# Patient Record
Sex: MALE | Race: WHITE | NOT HISPANIC OR LATINO | Employment: FULL TIME | URBAN - METROPOLITAN AREA
[De-identification: names, ages, dates, MRNs, and addresses within clinical notes are randomized per-mention and may not be internally consistent; named-entity substitution may affect disease eponyms.]

---

## 2017-01-25 PROBLEM — F32.A DEPRESSION: Status: ACTIVE | Noted: 2017-01-25

## 2017-01-25 PROBLEM — Z72.0 TOBACCO ABUSE: Status: ACTIVE | Noted: 2017-01-25

## 2017-01-25 PROBLEM — N52.9 ED (ERECTILE DYSFUNCTION): Status: ACTIVE | Noted: 2017-01-25

## 2017-01-26 ENCOUNTER — OFFICE VISIT (OUTPATIENT)
Dept: FAMILY MEDICINE CLINIC | Facility: CLINIC | Age: 42
End: 2017-01-26

## 2017-01-26 VITALS
WEIGHT: 212 LBS | BODY MASS INDEX: 33.27 KG/M2 | HEIGHT: 67 IN | RESPIRATION RATE: 18 BRPM | SYSTOLIC BLOOD PRESSURE: 104 MMHG | TEMPERATURE: 97 F | HEART RATE: 76 BPM | DIASTOLIC BLOOD PRESSURE: 80 MMHG | OXYGEN SATURATION: 96 %

## 2017-01-26 DIAGNOSIS — G25.81 RESTLESS LEGS SYNDROME (RLS): ICD-10-CM

## 2017-01-26 DIAGNOSIS — R53.83 FATIGUE, UNSPECIFIED TYPE: ICD-10-CM

## 2017-01-26 DIAGNOSIS — Z13.220 LIPID SCREENING: ICD-10-CM

## 2017-01-26 DIAGNOSIS — F39 MOOD DISORDER (HCC): Primary | ICD-10-CM

## 2017-01-26 LAB
ALBUMIN SERPL-MCNC: 4.5 G/DL (ref 3.2–4.8)
ALBUMIN/GLOB SERPL: 1.7 G/DL (ref 1.5–2.5)
ALP SERPL-CCNC: 91 U/L (ref 25–100)
ALT SERPL-CCNC: 37 U/L (ref 7–40)
AST SERPL-CCNC: 24 U/L (ref 0–33)
BASOPHILS # BLD AUTO: 0.03 10*3/MM3 (ref 0–0.2)
BASOPHILS NFR BLD AUTO: 0.5 % (ref 0–1)
BILIRUB SERPL-MCNC: 0.6 MG/DL (ref 0.3–1.2)
BUN SERPL-MCNC: 19 MG/DL (ref 9–23)
BUN/CREAT SERPL: 21.1 (ref 7–25)
CALCIUM SERPL-MCNC: 9.8 MG/DL (ref 8.7–10.4)
CHLORIDE SERPL-SCNC: 101 MMOL/L (ref 99–109)
CHOLEST SERPL-MCNC: 195 MG/DL (ref 0–200)
CO2 SERPL-SCNC: 27 MMOL/L (ref 20–31)
CREAT SERPL-MCNC: 0.9 MG/DL (ref 0.6–1.3)
EOSINOPHIL # BLD AUTO: 0.22 10*3/MM3 (ref 0.1–0.3)
EOSINOPHIL NFR BLD AUTO: 3.5 % (ref 0–3)
ERYTHROCYTE [DISTWIDTH] IN BLOOD BY AUTOMATED COUNT: 12.5 % (ref 11.3–14.5)
FERRITIN SERPL-MCNC: 300 NG/ML (ref 22–322)
GLOBULIN SER CALC-MCNC: 2.7 GM/DL
GLUCOSE SERPL-MCNC: 82 MG/DL (ref 70–100)
HCT VFR BLD AUTO: 49.2 % (ref 38.9–50.9)
HDLC SERPL-MCNC: 36 MG/DL (ref 40–60)
HGB BLD-MCNC: 16.3 G/DL (ref 13.1–17.5)
IMM GRANULOCYTES # BLD: 0.02 10*3/MM3 (ref 0–0.03)
IMM GRANULOCYTES NFR BLD: 0.3 % (ref 0–0.6)
IRON SATN MFR SERPL: 39 % (ref 20–50)
IRON SERPL-MCNC: 129 MCG/DL (ref 50–175)
LDLC SERPL CALC-MCNC: 133 MG/DL (ref 0–100)
LYMPHOCYTES # BLD AUTO: 2.46 10*3/MM3 (ref 0.6–4.8)
LYMPHOCYTES NFR BLD AUTO: 39.5 % (ref 24–44)
MCH RBC QN AUTO: 30.9 PG (ref 27–31)
MCHC RBC AUTO-ENTMCNC: 33.1 G/DL (ref 32–36)
MCV RBC AUTO: 93.4 FL (ref 80–99)
MONOCYTES # BLD AUTO: 0.38 10*3/MM3 (ref 0–1)
MONOCYTES NFR BLD AUTO: 6.1 % (ref 0–12)
NEUTROPHILS # BLD AUTO: 3.12 10*3/MM3 (ref 1.5–8.3)
NEUTROPHILS NFR BLD AUTO: 50.1 % (ref 41–71)
PLATELET # BLD AUTO: 340 10*3/MM3 (ref 150–450)
POTASSIUM SERPL-SCNC: 4.3 MMOL/L (ref 3.5–5.5)
PROT SERPL-MCNC: 7.2 G/DL (ref 5.7–8.2)
RBC # BLD AUTO: 5.27 10*6/MM3 (ref 4.2–5.76)
SODIUM SERPL-SCNC: 138 MMOL/L (ref 132–146)
TIBC SERPL-MCNC: 334 MCG/DL (ref 250–450)
TRIGL SERPL-MCNC: 131 MG/DL (ref 0–150)
UIBC SERPL-MCNC: 205 MCG/DL
VLDLC SERPL CALC-MCNC: 26.2 MG/DL
WBC # BLD AUTO: 6.23 10*3/MM3 (ref 3.5–10.8)

## 2017-01-26 PROCEDURE — 99214 OFFICE O/P EST MOD 30 MIN: CPT | Performed by: FAMILY MEDICINE

## 2017-01-26 RX ORDER — FLUOXETINE HYDROCHLORIDE 20 MG/1
20 CAPSULE ORAL DAILY
Qty: 30 CAPSULE | Refills: 5 | Status: SHIPPED | OUTPATIENT
Start: 2017-01-26 | End: 2017-08-18

## 2017-01-26 RX ORDER — ROPINIROLE 0.25 MG/1
TABLET, FILM COATED ORAL
Qty: 60 TABLET | Refills: 2 | Status: SHIPPED | OUTPATIENT
Start: 2017-01-26 | End: 2017-08-18

## 2017-01-26 NOTE — MR AVS SNAPSHOT
Aidan Breaux   1/26/2017 11:30 AM   Office Visit    Dept Phone:  [phone]   Encounter #:  [id number]    Provider:  Joaquim Smith MD   Department:  [facility]                Your Full Care Plan              Today's Medication Changes          These changes are accurate as of: 1/26/17 12:19 PM.  If you have any questions, ask your nurse or doctor.               New Medication(s)Ordered:     rOPINIRole 0.25 MG tablet   Commonly known as:  REQUIP   one po q hs x 1 week then 2 po q hs   Started by:  Joaquim Smith MD         Medication(s)that have changed:     FLUoxetine 20 MG capsule   Commonly known as:  PROzac   Take 1 capsule by mouth Daily.   What changed:  See the new instructions.   Changed by:  Joaquim Smith MD            Where to Get Your Medications      These medications were sent to [facility] [address] [place] [address] [address] [facility] [phone] [place] [phone]   [address] [facility] [address]     Phone:  [phone]     FLUoxetine 20 MG capsule    rOPINIRole 0.25 MG tablet                  Your Updated Medication List          This list is accurate as of: 1/26/17 12:19 PM.  Always use your most recent med list.                FLUoxetine 20 MG capsule   Commonly known as:  PROzac   Take 1 capsule by mouth Daily.       rOPINIRole 0.25 MG tablet   Commonly known as:  REQUIP   one po q hs x 1 week then 2 po q hs               We Performed the Following     CBC & Differential     Comprehensive Metabolic Panel     Ferritin     Iron and TIBC     Lipid Panel       You Were Diagnosed With        Codes Comments    Mood disorder    -  Primary ICD-10-CM: F39  ICD-9-CM: 296.90     Restless legs syndrome (RLS)     ICD-10-CM: G25.81  ICD-9-CM: 333.94     Fatigue, unspecified type     ICD-10-CM: R53.83  ICD-9-CM: 780.79     Lipid screening     ICD-10-CM: Z13.220  ICD-9-CM: V77.91       Instructions     None    Patient Instructions History  "     Upcoming Appointments     Visit Type Date Time Department    OFFICE VISIT 2017 11:30 AM [facility]      [employer] Signup     [facility] [employer] allows you to send messages to your doctor, view your test results, renew your prescriptions, schedule appointments, and more. To sign up, go to [employer] and click on the Sign Up Now link in the New User? box. Enter your [employer] Activation Code exactly as it appears below along with the last four digits of your Social Security Number and your Date of Birth () to complete the sign-up process. If you do not sign up before the expiration date, you must request a new code.    [employer] Activation Code: [email or web address]  Expires: 2017 12:19 PM    If you have questions, you can email [email or web address] or call [phone] to talk to our [employer] staff. Remember, [employer] is NOT to be used for urgent needs. For medical emergencies, dial 911.               Other Info from Your Visit           Allergies     No Known Allergies      Reason for Visit     Depression     Med Refill           Vital Signs     Blood Pressure Pulse Temperature Respirations Height Weight    104/80 76 97 °F (36.1 °C) (Temporal Artery ) 18 67\" (170.2 cm) 212 lb (96.2 kg)    Oxygen Saturation Body Mass Index Smoking Status             96% 33.2 kg/m2 Former Smoker         Problems and Diagnoses Noted     Mood disorder    -  Primary    Restless legs syndrome        Tiredness        Screening for cholesterol level            "

## 2017-01-26 NOTE — PROGRESS NOTES
Chief Complaint   Patient presents with   • Depression   • Med Refill       Subjective     Depression   Visit Type: follow-up  Patient presents with the following symptoms: depressed mood (swings at times. ), irritability (very at times), nervousness/anxiety and restlessness.  Patient is not experiencing: insomnia (no issues falling asleep), panic and suicidal ideas.  Frequency of symptoms: constantly   Severity: moderate   Sleep quality: good  Compliance with medications: misses meds at times. occ 1 week at a time.         Restless  More irritable when he does not take the meds.   Paces more without the meds  restless at times    Appetite ok   Plays video games a lot still. Plays after work.   He states hard to sleep at times.   Hours 5:15 to 2 am.   Bed at 7 am and up 1 to 2 pm. Moves a lot during sleep and jerks per wife while sleeping. May fall asleep if not doing something.   Does not fall asleep at work    doing e cig now      Past Medical History,Medications, Allergies, and social history was reviewed.    Review of Systems   Constitutional: Positive for irritability (very at times).   HENT: Negative.    Eyes: Negative.    Respiratory: Negative.    Cardiovascular: Negative.    Gastrointestinal: Negative.    Endocrine: Negative.    Genitourinary: Negative.    Musculoskeletal: Negative.    Neurological: Negative.    Psychiatric/Behavioral: Negative for suicidal ideas. The patient is nervous/anxious. The patient does not have insomnia (no issues falling asleep).        Objective     Physical Exam   Constitutional: He is oriented to person, place, and time. He appears well-developed and well-nourished.   HENT:   Head: Normocephalic and atraumatic.   Right Ear: External ear normal.   Left Ear: External ear normal.   Mouth/Throat: Oropharynx is clear and moist.   Eyes: EOM are normal. Pupils are equal, round, and reactive to light.   Cardiovascular: Normal rate, regular rhythm and normal heart sounds.     Pulmonary/Chest: Effort normal and breath sounds normal. No respiratory distress. He has no wheezes. He has no rales.   Musculoskeletal: He exhibits no edema.   Neurological: He is alert and oriented to person, place, and time. No cranial nerve deficit.   Skin: Skin is warm and dry.   Psychiatric: His speech is normal and behavior is normal. Judgment and thought content normal. His mood appears anxious. Cognition and memory are normal.   Very restless and has to continuously move his legs.   Nursing note and vitals reviewed.        Assessment/Plan     Problem List Items Addressed This Visit     None      Visit Diagnoses     Mood disorder    -  Primary    Relevant Medications    FLUoxetine (PROzac) 20 MG capsule    Restless legs syndrome (RLS)        Relevant Medications    rOPINIRole (REQUIP) 0.25 MG tablet    Other Relevant Orders    Comprehensive Metabolic Panel    CBC & Differential    Iron and TIBC    Ferritin    Fatigue, unspecified type        Relevant Orders    Comprehensive Metabolic Panel    CBC & Differential    Iron and TIBC    Ferritin    Lipid screening        Relevant Orders    Lipid Panel              DISCUSSION  Continue Prozac.  He does do better when he is taking this on a regular basis.    May have some restless leg syndrome and could be resulting in his fatigue and sedation when it is time for him to go to sleep.  He is not falling asleep at other times except when it is quiet and he is still.  We will check labs as noted for evaluation of iron deficiency or other cause.    We will try Requip.    He has never had his cholesterol checked.  We will check lipid screening.    Return pending labs.     MEDICATIONS PRESCRIBED  Requested Prescriptions     Signed Prescriptions Disp Refills   • rOPINIRole (REQUIP) 0.25 MG tablet 60 tablet 2     Sig: one po q hs x 1 week then 2 po q hs   • FLUoxetine (PROzac) 20 MG capsule 30 capsule 5     Sig: Take 1 capsule by mouth Daily.          Joaquim Smith,  MD

## 2017-08-18 ENCOUNTER — OFFICE VISIT (OUTPATIENT)
Dept: FAMILY MEDICINE CLINIC | Facility: CLINIC | Age: 42
End: 2017-08-18

## 2017-08-18 VITALS
SYSTOLIC BLOOD PRESSURE: 102 MMHG | RESPIRATION RATE: 20 BRPM | HEIGHT: 67 IN | DIASTOLIC BLOOD PRESSURE: 76 MMHG | HEART RATE: 64 BPM | TEMPERATURE: 97 F | WEIGHT: 204 LBS | BODY MASS INDEX: 32.02 KG/M2

## 2017-08-18 DIAGNOSIS — F34.1 DYSTHYMIA: Primary | ICD-10-CM

## 2017-08-18 PROCEDURE — 99213 OFFICE O/P EST LOW 20 MIN: CPT | Performed by: FAMILY MEDICINE

## 2017-08-18 RX ORDER — DULOXETIN HYDROCHLORIDE 30 MG/1
30 CAPSULE, DELAYED RELEASE ORAL DAILY
Qty: 7 CAPSULE | Refills: 0 | Status: SHIPPED | OUTPATIENT
Start: 2017-08-18 | End: 2017-09-18

## 2017-08-18 RX ORDER — DULOXETIN HYDROCHLORIDE 60 MG/1
60 CAPSULE, DELAYED RELEASE ORAL DAILY
Qty: 30 CAPSULE | Refills: 2 | Status: SHIPPED | OUTPATIENT
Start: 2017-08-18 | End: 2017-12-06 | Stop reason: SDUPTHER

## 2017-08-18 NOTE — PROGRESS NOTES
"  Chief Complaint   Patient presents with   • Depression     F/U. He doesn't fee like his medication is working any more       Subjective     Depression   Visit Type: follow-up (not wanting to get up, did not go to work yesterday)  Patient presents with the following symptoms: anhedonia, depressed mood, fatigue and irritability.  Patient is not experiencing: insomnia (sleeps all the time now), nervousness/anxiety and suicidal ideas.  Severity: moderate   Sleep quality: sleep and does not want to get up.        Jerks when sleeps at night, moving a lot at night    Decreased eating, + diet to cut back, decrease Mt Dew.   Drinks 5 Mt Dew. Cans and bottles.     Does not notice any improvement when takes the med  + indifferent  Prozac 20 mg daily    Increased side effects with higher dose      Past Medical History,Medications, Allergies, and social history was reviewed.    Review of Systems   Constitutional: Positive for fever and irritability.   HENT: Negative.    Respiratory: Negative.    Cardiovascular: Negative.    Gastrointestinal: Negative.    Psychiatric/Behavioral: Positive for dysphoric mood. Negative for suicidal ideas. The patient is not nervous/anxious and does not have insomnia (sleeps all the time now).        Objective     Vitals:    08/18/17 1130   BP: 102/76   Pulse: 64   Resp: 20   Temp: 97 °F (36.1 °C)   Weight: 204 lb (92.5 kg)   Height: 67\" (170.2 cm)        Physical Exam   Constitutional: He is oriented to person, place, and time. He appears well-developed and well-nourished.   HENT:   Head: Normocephalic and atraumatic.   Right Ear: External ear normal.   Left Ear: External ear normal.   Eyes: EOM are normal. Pupils are equal, round, and reactive to light.   Cardiovascular: Normal rate, regular rhythm and normal heart sounds.    Pulmonary/Chest: Effort normal and breath sounds normal. No respiratory distress. He has no wheezes. He has no rales.   Neurological: He is alert and oriented to person, " place, and time.   Skin: Skin is warm and dry.   Psychiatric: His behavior is normal. He exhibits a depressed mood.   Nursing note and vitals reviewed.        Assessment/Plan     Problem List Items Addressed This Visit        Other    Depression - Primary    Relevant Medications    DULoxetine (CYMBALTA) 30 MG capsule    DULoxetine (CYMBALTA) 60 MG capsule           Follow up: Return in about 1 month (around 9/18/2017).         DISCUSSION  Recommend change to Cymbalta.  We will hold Prozac today and tomorrow and then start Cymbalta 30 mg daily for 1 week and then 60 mg daily.  Follow-up in one month or sooner if having problems.  Denies any suicidal ideation.  Call with any side effects or issues or concerns.    MEDICATIONS PRESCRIBED  Requested Prescriptions     Signed Prescriptions Disp Refills   • DULoxetine (CYMBALTA) 30 MG capsule 7 capsule 0     Sig: Take 1 capsule by mouth Daily.   • DULoxetine (CYMBALTA) 60 MG capsule 30 capsule 2     Sig: Take 1 capsule by mouth Daily.          Joaquim Smith MD

## 2017-09-18 ENCOUNTER — OFFICE VISIT (OUTPATIENT)
Dept: FAMILY MEDICINE CLINIC | Facility: CLINIC | Age: 42
End: 2017-09-18

## 2017-09-18 VITALS
DIASTOLIC BLOOD PRESSURE: 76 MMHG | WEIGHT: 198 LBS | RESPIRATION RATE: 16 BRPM | BODY MASS INDEX: 31.08 KG/M2 | TEMPERATURE: 97.6 F | HEART RATE: 70 BPM | SYSTOLIC BLOOD PRESSURE: 110 MMHG | OXYGEN SATURATION: 98 % | HEIGHT: 67 IN

## 2017-09-18 DIAGNOSIS — F34.1 DYSTHYMIA: Primary | ICD-10-CM

## 2017-09-18 PROCEDURE — 99213 OFFICE O/P EST LOW 20 MIN: CPT | Performed by: FAMILY MEDICINE

## 2017-09-18 NOTE — PROGRESS NOTES
"  Chief Complaint   Patient presents with   • Depression     1 month follow up   • Med Refill       Subjective         Depression   Visit Type: follow-up (on Cymbalta. More quiet at times)  Patient presents with the following symptoms: depressed mood (gets up and has been doing more. ), fatigue (still sleeps same amount and does get up and not just sitting) and irritability (less irritable).  Patient is not experiencing: suicidal ideas.  Frequency of symptoms: most days   Severity: moderate   Sleep quality: good  Nighttime awakenings: none      lost 6 pounds and decreasing and decreasing Mt Dew      + loose stool since the Cymbalta.   about once day.     Getting up and going to work    Had read all the side effects and was concerned about taking it      Past Medical History,Medications, Allergies, and social history was reviewed.    Review of Systems   Constitutional: Positive for irritability (less irritable).   HENT: Negative.    Respiratory: Negative.    Cardiovascular: Negative.    Gastrointestinal: Positive for diarrhea.   Psychiatric/Behavioral: Positive for dysphoric mood and sleep disturbance. Negative for suicidal ideas.       Objective     Vitals:    09/18/17 1136   BP: 110/76   Pulse: 70   Resp: 16   Temp: 97.6 °F (36.4 °C)   TempSrc: Temporal Artery    SpO2: 98%   Weight: 198 lb (89.8 kg)   Height: 67\" (170.2 cm)        Physical Exam   Constitutional: He is oriented to person, place, and time. He appears well-developed and well-nourished.   HENT:   Head: Normocephalic and atraumatic.   Right Ear: External ear normal.   Left Ear: External ear normal.   Eyes: EOM are normal. Pupils are equal, round, and reactive to light.   Cardiovascular: Normal rate, regular rhythm and normal heart sounds.    Pulmonary/Chest: Effort normal and breath sounds normal. No respiratory distress. He has no wheezes. He has no rales.   Neurological: He is alert and oriented to person, place, and time.   Skin: Skin is warm and " dry.   Psychiatric: He has a normal mood and affect. His behavior is normal.   Nursing note and vitals reviewed.  Flat affect is unchanged      Assessment/Plan     Problem List Items Addressed This Visit        Other    Depression - Primary           Follow up: Return in about 3 months (around 12/18/2017), or if symptoms worsen or fail to improve.         DISCUSSION  Doing some better with the Cymbalta.  Since still early on in treatment, continue this and call in 1-2 months if not continuing to improve or sooner if worsening.  Otherwise follow-up in 3 months.  He and his wife agree.    Call sooner if side effects in the form of diarrhea worsen on do not improve.    Overall stable and doing well. Continue current medications.  Return in about 3 months (around 12/18/2017), or if symptoms worsen or fail to improve.       MEDICATIONS PRESCRIBED  Requested Prescriptions      No prescriptions requested or ordered in this encounter          Joaquim Smith MD

## 2017-12-06 DIAGNOSIS — F34.1 DYSTHYMIA: ICD-10-CM

## 2017-12-06 RX ORDER — DULOXETIN HYDROCHLORIDE 60 MG/1
CAPSULE, DELAYED RELEASE ORAL
Qty: 30 CAPSULE | Refills: 2 | Status: SHIPPED | OUTPATIENT
Start: 2017-12-06 | End: 2017-12-18

## 2017-12-18 ENCOUNTER — OFFICE VISIT (OUTPATIENT)
Dept: FAMILY MEDICINE CLINIC | Facility: CLINIC | Age: 42
End: 2017-12-18

## 2017-12-18 VITALS
HEIGHT: 67 IN | TEMPERATURE: 97.1 F | WEIGHT: 201 LBS | HEART RATE: 84 BPM | BODY MASS INDEX: 31.55 KG/M2 | DIASTOLIC BLOOD PRESSURE: 80 MMHG | OXYGEN SATURATION: 99 % | RESPIRATION RATE: 16 BRPM | SYSTOLIC BLOOD PRESSURE: 104 MMHG

## 2017-12-18 DIAGNOSIS — F34.1 DYSTHYMIA: Primary | ICD-10-CM

## 2017-12-18 DIAGNOSIS — F39 MOOD DISORDER (HCC): ICD-10-CM

## 2017-12-18 PROCEDURE — 99213 OFFICE O/P EST LOW 20 MIN: CPT | Performed by: FAMILY MEDICINE

## 2017-12-18 RX ORDER — DULOXETIN HYDROCHLORIDE 30 MG/1
CAPSULE, DELAYED RELEASE ORAL
Qty: 10 CAPSULE | Refills: 0 | Status: SHIPPED | OUTPATIENT
Start: 2017-12-18 | End: 2018-01-26

## 2017-12-18 NOTE — PROGRESS NOTES
Assessment/Plan     Problem List Items Addressed This Visit        Other    Depression - Primary    Relevant Medications    DULoxetine (CYMBALTA) 30 MG capsule    Vortioxetine HBr 10 MG tablet      Other Visit Diagnoses     Mood disorder        Relevant Medications    DULoxetine (CYMBALTA) 30 MG capsule    Vortioxetine HBr 10 MG tablet           Follow up: Return in about 5 weeks (around 1/22/2018), or if symptoms worsen or fail to improve.     DISCUSSION  Worsening depression and mood issues.  Side effects with Cymbalta.  We will wean Cymbalta as noted below and then 2 days after finishing Cymbalta, start Trintellix 10 mg one daily.  Follow-up in 5 weeks to recheck.  Call sooner if any side effects from medication or problems needing off of Cymbalta.  He agrees to call or follow up if not improving.    MEDICATIONS PRESCRIBED  Requested Prescriptions     Signed Prescriptions Disp Refills   • DULoxetine (CYMBALTA) 30 MG capsule 10 capsule 0     Sig: One po daily x 7 days then one po every other day for 3 doses then stop.   • Vortioxetine HBr 10 MG tablet 28 tablet 0     Sig: Take 10 mg by mouth Daily.              -------------------------------------------    Subjective     Chief Complaint   Patient presents with   • Depression     3 month follow up   • Med Refill       Depression   Visit Type: follow-up (not helping as well)  Patient presents with the following symptoms: depressed mood (more depressed at times), fatigue (sleep but restless all the time), irritability (more irritable) and nervousness/anxiety (anxious feeling, fearful).  Patient is not experiencing: suicidal ideas and suicidal planning.  Frequency of symptoms: most days   Severity: moderate   Sleep quality: fair  Nighttime awakenings: none        Has been forgetful Locked keys in trunk of car. Absent minded and getting worse  Loses things at work    Gets lightheaded and had been out of for a few days and felt bad    + diarrhea all the  "time  Increased urine  Does not have to get up during sleep    Failed prozac and cymbalta      Past Medical History,Medications, Allergies, and social history was reviewed.    Review of Systems   Constitutional: Positive for irritability (more irritable).   HENT: Negative.    Respiratory: Negative.    Cardiovascular: Negative.    Gastrointestinal: Positive for diarrhea.   Psychiatric/Behavioral: Positive for sleep disturbance. Negative for suicidal ideas. The patient is nervous/anxious (anxious feeling, fearful).        Objective     Vitals:    12/18/17 1142   BP: 104/80   Pulse: 84   Resp: 16   Temp: 97.1 °F (36.2 °C)   TempSrc: Temporal Artery    SpO2: 99%   Weight: 91.2 kg (201 lb)   Height: 170.2 cm (67.01\")        Physical Exam   Constitutional: He is oriented to person, place, and time. He appears well-developed and well-nourished.   HENT:   Head: Normocephalic and atraumatic.   Right Ear: External ear normal.   Left Ear: External ear normal.   Eyes: EOM are normal. Pupils are equal, round, and reactive to light.   Cardiovascular: Normal rate, regular rhythm and normal heart sounds.    Pulmonary/Chest: Effort normal and breath sounds normal. No respiratory distress. He has no wheezes. He has no rales.   Neurological: He is alert and oriented to person, place, and time.   Skin: Skin is warm and dry.   Psychiatric: His speech is normal and behavior is normal. Thought content normal. Cognition and memory are normal. He exhibits a depressed mood.   Flat yet anxious   Nursing note and vitals reviewed.              Joaquim Smith MD    "

## 2018-01-26 ENCOUNTER — OFFICE VISIT (OUTPATIENT)
Dept: FAMILY MEDICINE CLINIC | Facility: CLINIC | Age: 43
End: 2018-01-26

## 2018-01-26 VITALS
HEART RATE: 76 BPM | OXYGEN SATURATION: 96 % | WEIGHT: 202.5 LBS | HEIGHT: 67 IN | BODY MASS INDEX: 31.78 KG/M2 | RESPIRATION RATE: 16 BRPM | SYSTOLIC BLOOD PRESSURE: 98 MMHG | TEMPERATURE: 98.2 F | DIASTOLIC BLOOD PRESSURE: 74 MMHG

## 2018-01-26 DIAGNOSIS — F34.1 DYSTHYMIA: Primary | ICD-10-CM

## 2018-01-26 DIAGNOSIS — F39 MOOD DISORDER (HCC): ICD-10-CM

## 2018-01-26 PROCEDURE — 99213 OFFICE O/P EST LOW 20 MIN: CPT | Performed by: FAMILY MEDICINE

## 2018-01-26 RX ORDER — FLUOXETINE HYDROCHLORIDE 20 MG/1
20 CAPSULE ORAL DAILY
Qty: 30 CAPSULE | Refills: 5 | Status: SHIPPED | OUTPATIENT
Start: 2018-01-26 | End: 2019-09-30 | Stop reason: SDUPTHER

## 2018-01-26 NOTE — PROGRESS NOTES
"  Assessment/Plan     Problem List Items Addressed This Visit        Other    Depression - Primary    Relevant Medications    FLUoxetine (PROzac) 20 MG capsule      Other Visit Diagnoses     Mood disorder        Relevant Medications    FLUoxetine (PROzac) 20 MG capsule           Follow up: Return in about 1 month (around 2/26/2018), or if symptoms worsen or fail to improve.     DISCUSSION  Has not done well with Trintellix.  He seemed to do best with fluoxetine in the past.  We will change back to fluoxetine 20 mg daily but he should hold the Trintellix for 2 days then start the fluoxetine.  Follow-up in one month for recheck or sooner with problems.      MEDICATIONS PRESCRIBED  Requested Prescriptions     Signed Prescriptions Disp Refills   • FLUoxetine (PROzac) 20 MG capsule 30 capsule 5     Sig: Take 1 capsule by mouth Daily.          -------------------------------------------    Subjective     Chief Complaint   Patient presents with   • Dysthymia     follow up   • Med Refill       HPI    Depression  More aggressive since starting the Trintellix  Could see it some with Cymbalta but worse now  Does not take much to \"push buttons\"  Agitated easily  Info per wife. He agrees  Not hitting    In Prozac, Cymbalta and now trintellix  Had not been on any meds before  Never tried zoloft, celexa, or lexapro    Weaned the Cymbalta. + lightheaded with that but better now  Sleep like a rock per patient but he jerks per wife    No SI          Past Medical History,Medications, Allergies, and social history was reviewed.    Review of Systems   Constitutional: Negative.    HENT: Negative.    Respiratory: Negative.    Cardiovascular: Negative.    Psychiatric/Behavioral: Positive for dysphoric mood. Negative for sleep disturbance and suicidal ideas.       Objective     Vitals:    01/26/18 1135   BP: 98/74   Pulse: 76   Resp: 16   Temp: 98.2 °F (36.8 °C)   TempSrc: Temporal Artery    SpO2: 96%   Weight: 91.9 kg (202 lb 8 oz) " "  Height: 170.2 cm (67.01\")        Physical Exam   Constitutional: He is oriented to person, place, and time. He appears well-developed and well-nourished.   HENT:   Head: Normocephalic and atraumatic.   Right Ear: External ear normal.   Left Ear: External ear normal.   Eyes: EOM are normal. Pupils are equal, round, and reactive to light.   Pulmonary/Chest: Effort normal.   Neurological: He is alert and oriented to person, place, and time.   Skin: Skin is warm and dry.   Psychiatric: He has a normal mood and affect. His behavior is normal.   Nursing note and vitals reviewed.              Joaquim Smith MD    "

## 2018-02-26 ENCOUNTER — OFFICE VISIT (OUTPATIENT)
Dept: FAMILY MEDICINE CLINIC | Facility: CLINIC | Age: 43
End: 2018-02-26

## 2018-02-26 VITALS
BODY MASS INDEX: 31.31 KG/M2 | RESPIRATION RATE: 16 BRPM | OXYGEN SATURATION: 97 % | SYSTOLIC BLOOD PRESSURE: 92 MMHG | HEART RATE: 67 BPM | WEIGHT: 199.5 LBS | HEIGHT: 67 IN | DIASTOLIC BLOOD PRESSURE: 76 MMHG | TEMPERATURE: 97 F

## 2018-02-26 DIAGNOSIS — R09.81 NASAL CONGESTION: ICD-10-CM

## 2018-02-26 DIAGNOSIS — F34.1 DYSTHYMIA: Primary | ICD-10-CM

## 2018-02-26 PROCEDURE — 99213 OFFICE O/P EST LOW 20 MIN: CPT | Performed by: FAMILY MEDICINE

## 2018-02-26 RX ORDER — FLUTICASONE PROPIONATE 50 MCG
2 SPRAY, SUSPENSION (ML) NASAL DAILY
Qty: 16 G | Refills: 5 | Status: SHIPPED | OUTPATIENT
Start: 2018-02-26 | End: 2019-09-30

## 2018-02-26 NOTE — PROGRESS NOTES
"  Assessment/Plan     Problem List Items Addressed This Visit        Other    Depression - Primary      Other Visit Diagnoses     Nasal congestion        Relevant Medications    fluticasone (FLONASE) 50 MCG/ACT nasal spray           Follow up: Return in about 3 months (around 2018).     DISCUSSION  Stable on Prozac.  We will continue this at 20 mg per day.  Did not tolerate higher doses in the past.  Recheck in 3 months.    Trial of Flonase for nasal congestion.  Side effects explained.  Call if not helpful after several weeks.      MEDICATIONS PRESCRIBED  Requested Prescriptions     Signed Prescriptions Disp Refills   • fluticasone (FLONASE) 50 MCG/ACT nasal spray 16 g 5     Si sprays into each nostril Daily.            -------------------------------------------    Subjective     Chief Complaint   Patient presents with   • Dysthymia     one month follow up       HPI    Depression  better with prozac  Sleeping ok  Back in routine to sleep if not doing anything  No irritable  appetite ok  No Suicidal ideation  No side effects with meds    Prozac 20 mg was ideal, 40 mg caused + SE in the past    No dizziness    Nasal congestion   Comes and goes  Nostril block up at times  + daily all the time  Better in the air in the office        Past Medical History,Medications, Allergies, and social history was reviewed.    Review of Systems   Constitutional: Positive for fatigue.   HENT: Negative.         Nasal congestion   Respiratory: Negative.    Cardiovascular: Negative.    Gastrointestinal: Negative.    Neurological: Negative.    Psychiatric/Behavioral: Positive for dysphoric mood. Negative for sleep disturbance.       Objective     Vitals:    18 1210   BP: 92/76   Pulse: 67   Resp: 16   Temp: 97 °F (36.1 °C)   TempSrc: Temporal Artery    SpO2: 97%   Weight: 90.5 kg (199 lb 8 oz)   Height: 170.2 cm (67.01\")        Physical Exam   Constitutional: He is oriented to person, place, and time. He appears " well-developed and well-nourished.   HENT:   Head: Normocephalic and atraumatic.   Right Ear: External ear normal.   Left Ear: External ear normal.   No definite polyps seen on nasal examination.  Some mild swelling of the nasal turbinates   Eyes: EOM are normal. Pupils are equal, round, and reactive to light.   Cardiovascular: Normal rate, regular rhythm and normal heart sounds.    Pulmonary/Chest: Effort normal and breath sounds normal. No respiratory distress. He has no wheezes. He has no rales.   Neurological: He is alert and oriented to person, place, and time.   Skin: Skin is warm and dry.   Psychiatric: He has a normal mood and affect. His behavior is normal.   Seems more talkative today.   Nursing note and vitals reviewed.              Joaquim Smith MD

## 2019-09-30 ENCOUNTER — OFFICE VISIT (OUTPATIENT)
Dept: FAMILY MEDICINE CLINIC | Facility: CLINIC | Age: 44
End: 2019-09-30

## 2019-09-30 VITALS
TEMPERATURE: 98.1 F | SYSTOLIC BLOOD PRESSURE: 124 MMHG | BODY MASS INDEX: 33.27 KG/M2 | HEART RATE: 74 BPM | DIASTOLIC BLOOD PRESSURE: 84 MMHG | WEIGHT: 207 LBS | HEIGHT: 66 IN | RESPIRATION RATE: 18 BRPM

## 2019-09-30 DIAGNOSIS — F34.1 DYSTHYMIA: Primary | ICD-10-CM

## 2019-09-30 DIAGNOSIS — F39 MOOD DISORDER (HCC): ICD-10-CM

## 2019-09-30 PROCEDURE — 99213 OFFICE O/P EST LOW 20 MIN: CPT | Performed by: FAMILY MEDICINE

## 2019-09-30 RX ORDER — FLUOXETINE HYDROCHLORIDE 20 MG/1
20 CAPSULE ORAL DAILY
Qty: 30 CAPSULE | Refills: 2 | Status: SHIPPED | OUTPATIENT
Start: 2019-09-30 | End: 2020-05-18

## 2019-09-30 NOTE — PROGRESS NOTES
"  Assessment/Plan       Problems Addressed this Visit        Other    Depression - Primary    Relevant Medications    FLUoxetine (PROzac) 20 MG capsule      Other Visit Diagnoses     Mood disorder (CMS/[facility])        Relevant Medications    FLUoxetine (PROzac) 20 MG capsule            Follow up: Return in about 1 month (around 10/30/2019).     DISCUSSION  Restart Prozac as needed   He does not want labs done  Follow up in one month   Call if side effects  If not getting better or suicidal thoughts, he agrees          MEDICATIONS PRESCRIBED  Requested Prescriptions     Signed Prescriptions Disp Refills   • FLUoxetine (PROzac) 20 MG capsule 30 capsule 2     Sig: Take 1 capsule by mouth Daily.            -------------------------------------------    Subjective     Chief Complaint   Patient presents with   • mood disorder     f/u. med refills          Depression   Visit Type: follow-up (no med 10 months (2018))  Patient presents with the following symptoms: depressed mood.  Patient is not experiencing: insomnia and nervousness/anxiety.  Frequency of symptoms: constantly   Severity: moderate   Sleep quality: good        Not sure why stopped the med  ? Side effects (not sure)    + stressed  \" hate self\"    No suicidal plan  He believes that it is wrong to kill self    Appetite ok , has gained weight and trying to lose weight        Has made bad decisions ( quit meds, buying truck ($ issues),     Depression    Girlfriend left him,     Has not been taking his meds    Dog is there him now    Work daily, not missed work due to depression    20 mg Prozac was helpful before  Had side effects with 40 mg     Plays less Xbox now/ 2-3 hrs at a time    Working at [employer]        Social History     Tobacco Use   Smoking Status Former Smoker   • Last attempt to quit: 2014   • Years since quittin.9   Smokeless Tobacco Never Used          Past Medical History,Medications, Allergies, and social history was " "reviewed.           Review of Systems   Constitutional: Positive for fatigue.   HENT: Negative.    Respiratory: Negative.    Cardiovascular: Negative.    Gastrointestinal: Negative.    Genitourinary: Positive for decreased libido.   Psychiatric/Behavioral: Positive for depressed mood. The patient is not nervous/anxious and does not have insomnia.        Objective     Vitals:    09/30/19 1422   BP: 124/84   Pulse: 74   Resp: 18   Temp: 98.1 °F (36.7 °C)   Weight: 93.9 kg (207 lb)   Height: 167.6 cm (66\")          Physical Exam   Constitutional: Vital signs are normal. He appears well-developed and well-nourished.   HENT:   Head: Normocephalic and atraumatic.   Right Ear: Hearing, tympanic membrane, external ear and ear canal normal.   Left Ear: Hearing, tympanic membrane, external ear and ear canal normal.   Mouth/Throat: Oropharynx is clear and moist.   Eyes: Conjunctivae, EOM and lids are normal. Pupils are equal, round, and reactive to light.   Neck: Normal range of motion. Neck supple. No thyromegaly present.   Cardiovascular: Normal rate, regular rhythm and normal heart sounds. Exam reveals no friction rub.   No murmur heard.  Pulmonary/Chest: Effort normal and breath sounds normal. No respiratory distress. He has no wheezes. He has no rales.   Abdominal: Soft. Normal appearance and bowel sounds are normal. He exhibits no distension and no mass. There is no tenderness. There is no rebound and no guarding.   Musculoskeletal: He exhibits no edema.   Neurological: He is alert. He has normal strength.   Skin: Skin is warm and dry.   Psychiatric: His speech is normal. Cognition and memory are normal. He exhibits a depressed mood.   Nursing note and vitals reviewed.                Joaquim Smith MD    "

## 2019-10-31 ENCOUNTER — OFFICE VISIT (OUTPATIENT)
Dept: FAMILY MEDICINE CLINIC | Facility: CLINIC | Age: 44
End: 2019-10-31

## 2019-10-31 VITALS
HEIGHT: 66 IN | TEMPERATURE: 97.6 F | SYSTOLIC BLOOD PRESSURE: 126 MMHG | BODY MASS INDEX: 32.47 KG/M2 | WEIGHT: 202 LBS | RESPIRATION RATE: 18 BRPM | HEART RATE: 86 BPM | DIASTOLIC BLOOD PRESSURE: 80 MMHG

## 2019-10-31 DIAGNOSIS — F34.1 DYSTHYMIA: ICD-10-CM

## 2019-10-31 DIAGNOSIS — J06.9 VIRAL UPPER RESPIRATORY TRACT INFECTION: Primary | ICD-10-CM

## 2019-10-31 PROCEDURE — 99213 OFFICE O/P EST LOW 20 MIN: CPT | Performed by: FAMILY MEDICINE

## 2019-10-31 NOTE — PROGRESS NOTES
Assessment/Plan       Problems Addressed this Visit        Other    Depression      Other Visit Diagnoses     Viral upper respiratory tract infection    -  Primary            Follow up: Return in about 3 months (around 2020), or if symptoms worsen or fail to improve.     DISCUSSION  Probably viral. Rec increased fluids. Rest. Call if not getting better.     Depression: stable on meds. Continue meds        MEDICATIONS PRESCRIBED  Requested Prescriptions      No prescriptions requested or ordered in this encounter              -------------------------------------------    Subjective     Chief Complaint   Patient presents with   • dysthymia     1 month f/u    • Sore Throat   • Fever   • Cough         Sore Throat    This is a new (feeling some better now) problem. The current episode started in the past 7 days. The problem has been unchanged. Maximum temperature: + subj fever. The pain is mild. Associated symptoms include congestion (stuffy), coughing ( x 2 days , no production now) and a hoarse voice (monday and tuesday ). Associated symptoms comments: Throat getting better. He has had no exposure to strep or mono. He has tried NSAIDs for the symptoms. The treatment provided mild (fever broke now, [place] as needed) relief.   Depression   Visit Type: follow-up  Patient presents with the following symptoms: depressed mood (feeling some better) and insomnia (due to illness).  Patient is not experiencing: nervousness/anxiety, suicidal ideas and suicidal planning.  Frequency of symptoms: most days   Sleep quality: fair      concentration better with the prozac            Social History     Tobacco Use   Smoking Status Former Smoker   • Last attempt to quit: 2014   • Years since quittin.0   Smokeless Tobacco Never Used          Past Medical History,Medications, Allergies, and social history was reviewed.             Review of Systems   Constitutional: Positive for fever.   HENT: Positive for congestion  "(stuffy), hoarse voice (monday and tuesday ) and sore throat.    Respiratory: Positive for cough ( x 2 days , no production now).    Psychiatric/Behavioral: Positive for depressed mood (feeling some better). Negative for suicidal ideas. The patient has insomnia (due to illness). The patient is not nervous/anxious.        Objective     Vitals:    10/31/19 1436   BP: 126/80   Pulse: 86   Resp: 18   Temp: 97.6 °F (36.4 °C)   Weight: 91.6 kg (202 lb)   Height: 167.6 cm (66\")          Physical Exam   Constitutional: He appears well-developed and well-nourished.   HENT:   Head: Normocephalic and atraumatic.   Right Ear: Hearing and external ear normal. Tympanic membrane is retracted. Tympanic membrane is not erythematous.   Left Ear: Hearing and external ear normal. Tympanic membrane is retracted. Tympanic membrane is not erythematous.   Mouth/Throat: Uvula is midline and oropharynx is clear and moist. No oropharyngeal exudate or posterior oropharyngeal erythema.   Eyes: EOM are normal. Pupils are equal, round, and reactive to light.   Cardiovascular: Normal rate, regular rhythm and normal heart sounds.   Pulmonary/Chest: Effort normal and breath sounds normal. No respiratory distress. He has no wheezes. He has no rales.   Neurological: He is alert.   Skin: Skin is warm and dry.   Psychiatric: He has a normal mood and affect. His behavior is normal.   Nursing note and vitals reviewed.                Joaquim Smith MD    "

## 2020-05-18 DIAGNOSIS — F39 MOOD DISORDER (HCC): ICD-10-CM

## 2020-05-18 RX ORDER — FLUOXETINE HYDROCHLORIDE 20 MG/1
CAPSULE ORAL
Qty: 30 CAPSULE | Refills: 0 | Status: SHIPPED | OUTPATIENT
Start: 2020-05-18 | End: 2020-07-30 | Stop reason: SDUPTHER

## 2020-07-30 ENCOUNTER — OFFICE VISIT (OUTPATIENT)
Dept: FAMILY MEDICINE CLINIC | Facility: CLINIC | Age: 45
End: 2020-07-30

## 2020-07-30 VITALS
DIASTOLIC BLOOD PRESSURE: 88 MMHG | TEMPERATURE: 98.6 F | SYSTOLIC BLOOD PRESSURE: 122 MMHG | WEIGHT: 214 LBS | BODY MASS INDEX: 34.39 KG/M2 | RESPIRATION RATE: 18 BRPM | HEIGHT: 66 IN | HEART RATE: 78 BPM

## 2020-07-30 DIAGNOSIS — F39 MOOD DISORDER (HCC): Primary | ICD-10-CM

## 2020-07-30 DIAGNOSIS — N52.9 ERECTILE DYSFUNCTION, UNSPECIFIED ERECTILE DYSFUNCTION TYPE: ICD-10-CM

## 2020-07-30 PROCEDURE — 99213 OFFICE O/P EST LOW 20 MIN: CPT | Performed by: FAMILY MEDICINE

## 2020-07-30 RX ORDER — FLUOXETINE HYDROCHLORIDE 20 MG/1
20 CAPSULE ORAL DAILY
Qty: 90 CAPSULE | Refills: 1 | Status: SHIPPED | OUTPATIENT
Start: 2020-07-30 | End: 2023-03-07

## 2020-07-30 RX ORDER — SILDENAFIL 100 MG/1
50-100 TABLET, FILM COATED ORAL DAILY PRN
Qty: 10 TABLET | Refills: 2 | Status: SHIPPED | OUTPATIENT
Start: 2020-07-30 | End: 2023-03-07

## 2020-07-30 NOTE — PROGRESS NOTES
Assessment/Plan       Problems Addressed this Visit        Other    ED (erectile dysfunction)    Relevant Medications    sildenafil (Viagra) 100 MG tablet      Other Visit Diagnoses     Mood disorder (CMS/HCC)    -  Primary    Relevant Medications    FLUoxetine (PROzac) 20 MG capsule            Follow up: Return in about 6 months (around 2021).     DISCUSSION  Mood disorder. Stable. Doing well on meds. Continue prozac    ED. Has used Viagra before so will try again . Side effects explained.     Since doing well, follow up in 6 months      MEDICATIONS PRESCRIBED  Requested Prescriptions     Signed Prescriptions Disp Refills   • sildenafil (Viagra) 100 MG tablet 10 tablet 2     Sig: Take 0.5-1 tablets by mouth Daily As Needed for Erectile Dysfunction.   • FLUoxetine (PROzac) 20 MG capsule 90 capsule 1     Sig: Take 1 capsule by mouth Daily.              -------------------------------------------    Subjective     Chief Complaint   Patient presents with   • Depression     med refills         Depression   Visit Type: follow-up (has kicked the x box addiction. 5 hrs pet day and now 5 hrs per month.. bought a motorcycle. taking the prozac)  Patient presents with the following symptoms: depressed mood.  Patient is not experiencing: insomnia, suicidal ideas and suicidal planning.  Frequency of symptoms: occasionally   Severity: mild         No side effects of meds    Single for 1 yr  May be getting back in a relationship      ED  Some ED issues  60% erection  Thinks age related  No PEJ  Libido ok      Quit tobacco    Viagra helped it he past              Social History     Tobacco Use   Smoking Status Former Smoker   • Last attempt to quit: 2014   • Years since quittin.7   Smokeless Tobacco Never Used          Past Medical History,Medications, Allergies, and social history was reviewed.          Review of Systems   Constitutional: Negative.    HENT: Negative.    Respiratory: Negative.    Cardiovascular:  "Negative.    Gastrointestinal: Negative.    Psychiatric/Behavioral: Positive for depressed mood. Negative for suicidal ideas. The patient does not have insomnia.        Objective     Vitals:    07/30/20 1041   BP: 122/88   Pulse: 78   Resp: 18   Temp: 98.6 °F (37 °C)   Weight: 97.1 kg (214 lb)   Height: 167.6 cm (66\")          Physical Exam   Constitutional: Vital signs are normal. He appears well-developed and well-nourished.   HENT:   Head: Normocephalic and atraumatic.   Right Ear: Hearing, tympanic membrane, external ear and ear canal normal.   Left Ear: Hearing, tympanic membrane, external ear and ear canal normal.   Mouth/Throat: Oropharynx is clear and moist.   Eyes: Pupils are equal, round, and reactive to light. Conjunctivae, EOM and lids are normal.   Neck: Normal range of motion. Neck supple. No thyromegaly present.   Cardiovascular: Normal rate, regular rhythm and normal heart sounds. Exam reveals no friction rub.   No murmur heard.  Pulmonary/Chest: Effort normal and breath sounds normal. No respiratory distress. He has no wheezes. He has no rales.   Abdominal: Normal appearance.   Musculoskeletal: He exhibits no edema.   Neurological: He is alert. He has normal strength.   Skin: Skin is warm and dry.   Psychiatric: He has a normal mood and affect. His speech is normal. Cognition and memory are normal.   Nursing note and vitals reviewed.                Joaquim Smith MD    "

## 2021-12-08 ENCOUNTER — TRANSCRIBE ORDERS (OUTPATIENT)
Dept: ADMINISTRATIVE | Facility: HOSPITAL | Age: 46
End: 2021-12-08

## 2021-12-08 DIAGNOSIS — R19.03 RIGHT LOWER QUADRANT ABDOMINAL SWELLING, MASS AND LUMP: Primary | ICD-10-CM

## 2021-12-10 ENCOUNTER — HOSPITAL ENCOUNTER (OUTPATIENT)
Dept: CT IMAGING | Facility: HOSPITAL | Age: 46
Discharge: HOME | End: 2021-12-10
Admitting: SURGERY

## 2021-12-10 DIAGNOSIS — R19.03 RIGHT LOWER QUADRANT ABDOMINAL SWELLING, MASS AND LUMP: ICD-10-CM

## 2021-12-10 PROCEDURE — 74176 CT ABD & PELVIS W/O CONTRAST: CPT

## 2021-12-21 ENCOUNTER — TRANSCRIBE ORDERS (OUTPATIENT)
Dept: ADMINISTRATIVE | Facility: HOSPITAL | Age: 46
End: 2021-12-21

## 2021-12-21 ENCOUNTER — TELEPHONE (OUTPATIENT)
Dept: FAMILY MEDICINE CLINIC | Facility: CLINIC | Age: 46
End: 2021-12-21

## 2021-12-21 DIAGNOSIS — Z11.59 ENCOUNTER FOR SCREENING FOR VIRAL DISEASE: Primary | ICD-10-CM

## 2022-01-04 NOTE — TELEPHONE ENCOUNTER
Pt called back stating he had made a mistake that the paperwork needed to be completed by his workers comp doctor not  and apologized for the misunderstanding.

## 2022-01-14 ENCOUNTER — LAB (OUTPATIENT)
Dept: PREADMISSION TESTING | Facility: HOSPITAL | Age: 47
End: 2022-01-14

## 2022-01-14 DIAGNOSIS — Z11.59 ENCOUNTER FOR SCREENING FOR VIRAL DISEASE: ICD-10-CM

## 2022-01-14 LAB — SARS-COV-2 RNA PNL SPEC NAA+PROBE: NOT DETECTED

## 2022-01-14 PROCEDURE — C9803 HOPD COVID-19 SPEC COLLECT: HCPCS

## 2022-01-14 PROCEDURE — U0004 COV-19 TEST NON-CDC HGH THRU: HCPCS | Performed by: SURGERY

## 2023-02-13 ENCOUNTER — TELEPHONE (OUTPATIENT)
Dept: FAMILY MEDICINE CLINIC | Facility: CLINIC | Age: 48
End: 2023-02-13
Payer: COMMERCIAL

## 2023-03-07 ENCOUNTER — OFFICE VISIT (OUTPATIENT)
Dept: FAMILY MEDICINE CLINIC | Facility: CLINIC | Age: 48
End: 2023-03-07
Payer: COMMERCIAL

## 2023-03-07 VITALS
HEART RATE: 59 BPM | DIASTOLIC BLOOD PRESSURE: 86 MMHG | TEMPERATURE: 97.8 F | OXYGEN SATURATION: 99 % | RESPIRATION RATE: 18 BRPM | BODY MASS INDEX: 34.28 KG/M2 | HEIGHT: 67 IN | WEIGHT: 218.4 LBS | SYSTOLIC BLOOD PRESSURE: 114 MMHG

## 2023-03-07 DIAGNOSIS — F32.2 CURRENT SEVERE EPISODE OF MAJOR DEPRESSIVE DISORDER WITHOUT PSYCHOTIC FEATURES, UNSPECIFIED WHETHER RECURRENT: Primary | ICD-10-CM

## 2023-03-07 PROCEDURE — 99213 OFFICE O/P EST LOW 20 MIN: CPT | Performed by: PHYSICIAN ASSISTANT

## 2023-03-07 RX ORDER — VENLAFAXINE HYDROCHLORIDE 37.5 MG/1
37.5 CAPSULE, EXTENDED RELEASE ORAL DAILY
Qty: 30 CAPSULE | Refills: 1 | Status: SHIPPED | OUTPATIENT
Start: 2023-03-07 | End: 2023-04-04

## 2023-03-07 NOTE — PROGRESS NOTES
"Subjective   Aidan Breaux is a 47 y.o. male.     History of Present Illness   F/u for depression   Has not been seen in office in over a year   Adopted by his grandparents.   Bio mom passed of cancer when he was 21   Bio dad is still living   Grandfather passed in 2012. Feels some guilt over this as he was helping care for him at the time   Has half siblings that he is somewhat close with   Tried prozac but notes it stopped working after awhile     Works at [employer]   Works as [employer] .     Hernia surgery last year. WC related. Umbilical. Did not need to put mesh in   Belly button still feels hard but no pain.       The following portions of the patient's history were reviewed and updated as appropriate: allergies, current medications, past family history, past medical history, past social history, past surgical history and problem list.    Review of Systems  As noted per HPI    Objective    Blood pressure 114/86, pulse 59, temperature 97.8 °F (36.6 °C), resp. rate 18, height 170.2 cm (67\"), weight 99.1 kg (218 lb 6.4 oz), SpO2 99 %.     Physical Exam  Vitals reviewed.   Cardiovascular:      Rate and Rhythm: Normal rate.   Pulmonary:      Effort: Pulmonary effort is normal.   Neurological:      Mental Status: He is alert and oriented to person, place, and time.   Psychiatric:         Mood and Affect: Mood is depressed.         Assessment & Plan   Diagnoses and all orders for this visit:    1. Current severe episode of major depressive disorder without psychotic features, unspecified whether recurrent (HCC) (Primary)  -     venlafaxine XR (Effexor XR) 37.5 MG 24 hr capsule; Take 1 capsule by mouth Daily.  Dispense: 30 capsule; Refill: 1    trial of Effexor as noted for mood. Follow up as directed. Patient to let office know if he wishes to pursue counseling as well and can arrange referral            "

## 2023-04-04 DIAGNOSIS — F32.2 CURRENT SEVERE EPISODE OF MAJOR DEPRESSIVE DISORDER WITHOUT PSYCHOTIC FEATURES, UNSPECIFIED WHETHER RECURRENT: ICD-10-CM

## 2023-04-04 RX ORDER — VENLAFAXINE HYDROCHLORIDE 37.5 MG/1
CAPSULE, EXTENDED RELEASE ORAL
Qty: 30 CAPSULE | Refills: 0 | Status: SHIPPED | OUTPATIENT
Start: 2023-04-04 | End: 2023-04-18

## 2023-04-18 ENCOUNTER — OFFICE VISIT (OUTPATIENT)
Dept: FAMILY MEDICINE CLINIC | Facility: CLINIC | Age: 48
End: 2023-04-18
Payer: COMMERCIAL

## 2023-04-18 VITALS
RESPIRATION RATE: 18 BRPM | BODY MASS INDEX: 29.2 KG/M2 | DIASTOLIC BLOOD PRESSURE: 72 MMHG | TEMPERATURE: 97.5 F | OXYGEN SATURATION: 98 % | HEART RATE: 71 BPM | WEIGHT: 208.6 LBS | SYSTOLIC BLOOD PRESSURE: 112 MMHG | HEIGHT: 71 IN

## 2023-04-18 DIAGNOSIS — F32.1 CURRENT MODERATE EPISODE OF MAJOR DEPRESSIVE DISORDER WITHOUT PRIOR EPISODE: Primary | ICD-10-CM

## 2023-04-18 PROCEDURE — 99213 OFFICE O/P EST LOW 20 MIN: CPT | Performed by: PHYSICIAN ASSISTANT

## 2023-04-18 RX ORDER — VENLAFAXINE HYDROCHLORIDE 75 MG/1
75 CAPSULE, EXTENDED RELEASE ORAL DAILY
Qty: 90 CAPSULE | Refills: 0 | Status: SHIPPED | OUTPATIENT
Start: 2023-04-18
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
## 2023-08-07 ENCOUNTER — OFFICE VISIT (OUTPATIENT)
Dept: FAMILY MEDICINE CLINIC | Facility: CLINIC | Age: 48
End: 2023-08-07
Payer: COMMERCIAL

## 2023-08-07 VITALS
OXYGEN SATURATION: 99 % | DIASTOLIC BLOOD PRESSURE: 66 MMHG | BODY MASS INDEX: 28.85 KG/M2 | RESPIRATION RATE: 20 BRPM | SYSTOLIC BLOOD PRESSURE: 104 MMHG | HEART RATE: 95 BPM | WEIGHT: 183.8 LBS | TEMPERATURE: 97.1 F | HEIGHT: 67 IN

## 2023-08-07 DIAGNOSIS — Z20.822 EXPOSURE TO COVID-19 VIRUS: ICD-10-CM

## 2023-08-07 DIAGNOSIS — U07.1 COVID-19: Primary | ICD-10-CM

## 2023-08-07 LAB
EXPIRATION DATE: ABNORMAL
FLUAV AG UPPER RESP QL IA.RAPID: NOT DETECTED
FLUBV AG UPPER RESP QL IA.RAPID: NOT DETECTED
INTERNAL CONTROL: ABNORMAL
Lab: ABNORMAL
SARS-COV-2 AG UPPER RESP QL IA.RAPID: DETECTED

## 2023-08-07 PROCEDURE — 99213 OFFICE O/P EST LOW 20 MIN: CPT | Performed by: PHYSICIAN ASSISTANT

## 2023-08-07 PROCEDURE — 87428 SARSCOV & INF VIR A&B AG IA: CPT | Performed by: PHYSICIAN ASSISTANT

## 2023-08-07 RX ORDER — PREDNISONE 10 MG/1
TABLET ORAL
Qty: 21 EACH | Refills: 0 | Status: SHIPPED | OUTPATIENT
Start: 2023-08-07

## 2023-08-07 NOTE — LETTER
August 7, 2023     Patient: Aidan Breaux   YOB: 1975   Date of Visit: 8/7/2023       To Whom It May Concern:    It is my medical opinion that Aidan Breaux may return to work on 8/12/23. Patient had positive COVID 19 test at our office on 8/7/23.            Sincerely,        HAYDEE Flores

## 2023-08-07 NOTE — PROGRESS NOTES
"Subjective   Aidan Breaux is a 48 y.o. male.     History of Present Illness   Pt presents requesting COVID 19 testing   Was exposed at work at [employer]. 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 States he was told from his supervisor not to get tested so he can keep on working and even that he did not need to wear a mask.     The following portions of the patient's history were reviewed and updated as appropriate: allergies, current medications, past family history, past medical history, past social history, past surgical history, and problem list.    Review of Systems  As noted per HPI     Objective   Blood pressure 104/66, pulse 95, temperature 97.1 øF (36.2 øC), resp. rate 20, height 170.2 cm (67\"), weight 83.4 kg (183 lb 12.8 oz), SpO2 99 %.     Physical Exam  Vitals and nursing note reviewed.   Constitutional:       Appearance: Normal appearance.   Cardiovascular:      Rate and Rhythm: Normal rate and regular rhythm.   Pulmonary:      Effort: Pulmonary effort is normal. No respiratory distress.      Breath sounds: Normal breath sounds. No wheezing or rhonchi.   Musculoskeletal:         General: Normal range of motion.   Skin:     General: Skin is warm and dry.   Neurological:      Mental Status: He is alert and oriented to person, place, and time.   Psychiatric:         Mood and Affect: Mood normal.         Behavior: Behavior normal.       Assessment & Plan   Diagnoses and all orders for this visit:    1. COVID-19 (Primary)  -     predniSONE (DELTASONE) 10 MG (21) dose pack; Use as directed on package  Dispense: 21 each; Refill: 0    2. Exposure to COVID-19 virus  -     " POCT SARS-CoV-2 Antigen CHLOE + Flu    COVID 19 positive. Pt aware   Note for 5 day quarantine provided   Okay for FMLA if work is requiring   Wear mask X 10 days   Report to ER if difficulty breathing develops   Steroid taper for symptoms if needed

## 2023-08-24 ENCOUNTER — TELEPHONE (OUTPATIENT)
Dept: FAMILY MEDICINE CLINIC | Facility: CLINIC | Age: 48
End: 2023-08-24
Payer: COMMERCIAL

## 2023-08-30 ENCOUNTER — TELEPHONE (OUTPATIENT)
Dept: FAMILY MEDICINE CLINIC | Facility: CLINIC | Age: 48
End: 2023-08-30
Payer: COMMERCIAL

## 2023-09-08 ENCOUNTER — TELEPHONE (OUTPATIENT)
Dept: FAMILY MEDICINE CLINIC | Facility: CLINIC | Age: 48
End: 2023-09-08
Payer: COMMERCIAL

## 2023-11-16 ENCOUNTER — TELEPHONE (OUTPATIENT)
Dept: FAMILY MEDICINE CLINIC | Facility: CLINIC | Age: 48
End: 2023-11-16
Payer: COMMERCIAL

## 2023-11-16 NOTE — TELEPHONE ENCOUNTER
LVM several times for pt to sign release for FMLA forms to be completed-forms are in the EKG tray to be scanned in file

## 2024-01-25 DIAGNOSIS — F32.4 MAJOR DEPRESSIVE DISORDER WITH SINGLE EPISODE, IN PARTIAL REMISSION: ICD-10-CM

## 2024-01-25 RX ORDER — VENLAFAXINE HYDROCHLORIDE 75 MG/1
75 CAPSULE, EXTENDED RELEASE ORAL DAILY
Qty: 90 CAPSULE | Refills: 0 | Status: SHIPPED | OUTPATIENT
Start: 2024-01-25

## 2024-04-07 DIAGNOSIS — F32.4 MAJOR DEPRESSIVE DISORDER WITH SINGLE EPISODE, IN PARTIAL REMISSION: ICD-10-CM

## 2024-04-08 RX ORDER — VENLAFAXINE HYDROCHLORIDE 75 MG/1
75 CAPSULE, EXTENDED RELEASE ORAL DAILY
Qty: 90 CAPSULE | Refills: 0 | Status: SHIPPED | OUTPATIENT
Start: 2024-04-08

## 2024-04-18 ENCOUNTER — OFFICE VISIT (OUTPATIENT)
Dept: FAMILY MEDICINE CLINIC | Facility: CLINIC | Age: 49
End: 2024-04-18
Payer: COMMERCIAL

## 2024-04-18 VITALS
SYSTOLIC BLOOD PRESSURE: 118 MMHG | TEMPERATURE: 97.5 F | RESPIRATION RATE: 16 BRPM | BODY MASS INDEX: 25.27 KG/M2 | HEART RATE: 74 BPM | WEIGHT: 161 LBS | DIASTOLIC BLOOD PRESSURE: 70 MMHG | HEIGHT: 67 IN

## 2024-04-18 DIAGNOSIS — F33.41 RECURRENT MAJOR DEPRESSIVE DISORDER, IN PARTIAL REMISSION: Primary | ICD-10-CM

## 2024-04-18 DIAGNOSIS — R63.4 WEIGHT LOSS: ICD-10-CM

## 2024-04-18 DIAGNOSIS — Z13.6 ENCOUNTER FOR LIPID SCREENING FOR CARDIOVASCULAR DISEASE: ICD-10-CM

## 2024-04-18 DIAGNOSIS — R21 RASH: ICD-10-CM

## 2024-04-18 DIAGNOSIS — Z13.220 ENCOUNTER FOR LIPID SCREENING FOR CARDIOVASCULAR DISEASE: ICD-10-CM

## 2024-04-18 PROCEDURE — 99214 OFFICE O/P EST MOD 30 MIN: CPT | Performed by: FAMILY MEDICINE

## 2024-04-18 NOTE — PROGRESS NOTES
"Chief Complaint  Depression (F/u )    Subjective          Aidan Breaux presents to [facility]    History of Present Illness  The patient is here for follow-up.    The patient reports a general sense of well-being. He continues to take venlafaxine daily, which he believes has improved his mood. However, he experienced a challenging period between 02/2024 and 03/2024, during which he contemplated increasing the dosage. His depressive symptoms tend to manifest during the holiday season, but he currently reports an improvement in his condition. He denies experiencing anxiety.    The patient has experienced weight loss, which he attributes to his medication. He expresses satisfaction with his weight loss journey, noting a previous weight range of 224 to 183 pounds. He admits to not adhering to a healthy diet, typically consuming one meal per day, occasionally consuming food from the cafeteria at his workplace, and frequently consuming [employer]. He has not consumed any food today. His last blood work was conducted 7 years ago. He experiences lightheadedness upon standing after physical exertion, which he attributes to his medication. He denies experiencing any pain.    The patient reports a rash on his left forehead, which he describes as a heat rash. The rash is intermittent and not as noticeable, but never completely resolves.   He works at [employer]. He vapes. He does not drink alcohol.        OTHER NOTES:        Review of Systems   Constitutional:  Positive for unexpected weight loss.   Respiratory:  Negative for shortness of breath.    Cardiovascular:  Negative for chest pain.   Gastrointestinal: Negative.    Psychiatric/Behavioral:  Positive for depressed mood. The patient is not nervous/anxious.    All other systems reviewed and are negative.       Objective       Vital Signs:   /70   Pulse 74   Temp 97.5 °F (36.4 °C)   Resp 16   Ht 170.2 cm (67\")   Wt 73 kg (161 lb)   " BMI 25.22 kg/m²     Physical Exam  Vitals and nursing note reviewed.   Constitutional:       Appearance: He is well-developed.   HENT:      Head: Normocephalic and atraumatic.      Right Ear: External ear normal.      Left Ear: External ear normal.   Eyes:      Pupils: Pupils are equal, round, and reactive to light.   Cardiovascular:      Rate and Rhythm: Normal rate and regular rhythm.      Heart sounds: Normal heart sounds.   Pulmonary:      Effort: Pulmonary effort is normal. No respiratory distress.      Breath sounds: Normal breath sounds. No wheezing or rales.   Skin:     General: Skin is warm and dry.   Neurological:      Mental Status: He is alert.   Psychiatric:         Mood and Affect: Mood normal.         Behavior: Behavior normal.            Physical Exam      Result Review :            Other Results    Results               Assessment and Plan    Diagnoses and all orders for this visit:    1. Recurrent major depressive disorder, in partial remission (Primary)    2. Weight loss  -     CBC & Differential  -     Comprehensive Metabolic Panel  -     TSH    3. Encounter for lipid screening for cardiovascular disease  -     Comprehensive Metabolic Panel  -     Lipid Panel With / Chol / HDL Ratio    4. Rash               DISCUSSION    Assessment & Plan  1. Depression.  The patient's depression has shown significant improvement. He has experienced a significant weight loss, which he attributes to reduced food intake. The patient will persist with the prescribed venlafaxine regimen, which has been beneficial in managing his depression.    2. Weight loss.  A comprehensive blood work will be conducted today to investigate the patient's weight loss. Additionally, a lipid panel will be conducted for screening purposes.    3. Rash.  The patient has reported a rash on his left forehead. Hydrocortisone cream is recommended for the treatment of the rash. Should the hydrocortisone cream prove ineffective, a referral to a  dermatologist will be considered.    Follow-up  The patient is scheduled for a follow-up visit in 6 months for a re-evaluation.        Follow Up   Return in about 6 months (around 10/18/2024).    Patient was given instructions and counseling regarding his condition or for health maintenance advice. Please see specific information pulled into the AVS if appropriate.       Joaquim Smith MD    Patient or patient representative verbalized consent for the use of Ambient Listening during the visit with  Joaquim Smith MD for chart documentation. 4/18/2024  14:07 EDT

## 2024-04-19 LAB
ALBUMIN SERPL-MCNC: 4.4 G/DL (ref 4.1–5.1)
ALBUMIN/GLOB SERPL: 2.2 {RATIO} (ref 1.2–2.2)
ALP SERPL-CCNC: 96 IU/L (ref 44–121)
ALT SERPL-CCNC: 71 IU/L (ref 0–44)
AST SERPL-CCNC: 50 IU/L (ref 0–40)
BASOPHILS # BLD AUTO: 0 X10E3/UL (ref 0–0.2)
BASOPHILS NFR BLD AUTO: 1 %
BILIRUB SERPL-MCNC: 0.5 MG/DL (ref 0–1.2)
BUN SERPL-MCNC: 14 MG/DL (ref 6–24)
BUN/CREAT SERPL: 15 (ref 9–20)
CALCIUM SERPL-MCNC: 9.5 MG/DL (ref 8.7–10.2)
CHLORIDE SERPL-SCNC: 104 MMOL/L (ref 96–106)
CHOLEST SERPL-MCNC: 139 MG/DL (ref 100–199)
CHOLEST/HDLC SERPL: 2.8 RATIO (ref 0–5)
CO2 SERPL-SCNC: 26 MMOL/L (ref 20–29)
CREAT SERPL-MCNC: 0.94 MG/DL (ref 0.76–1.27)
EGFRCR SERPLBLD CKD-EPI 2021: 101 ML/MIN/1.73
EOSINOPHIL # BLD AUTO: 0.2 X10E3/UL (ref 0–0.4)
EOSINOPHIL NFR BLD AUTO: 4 %
ERYTHROCYTE [DISTWIDTH] IN BLOOD BY AUTOMATED COUNT: 12.6 % (ref 11.6–15.4)
GLOBULIN SER CALC-MCNC: 2 G/DL (ref 1.5–4.5)
GLUCOSE SERPL-MCNC: 76 MG/DL (ref 70–99)
HCT VFR BLD AUTO: 47.4 % (ref 37.5–51)
HDLC SERPL-MCNC: 49 MG/DL
HGB BLD-MCNC: 15.6 G/DL (ref 13–17.7)
IMM GRANULOCYTES # BLD AUTO: 0 X10E3/UL (ref 0–0.1)
IMM GRANULOCYTES NFR BLD AUTO: 0 %
LDLC SERPL CALC-MCNC: 78 MG/DL (ref 0–99)
LYMPHOCYTES # BLD AUTO: 1.8 X10E3/UL (ref 0.7–3.1)
LYMPHOCYTES NFR BLD AUTO: 35 %
MCH RBC QN AUTO: 30.8 PG (ref 26.6–33)
MCHC RBC AUTO-ENTMCNC: 32.9 G/DL (ref 31.5–35.7)
MCV RBC AUTO: 94 FL (ref 79–97)
MONOCYTES # BLD AUTO: 0.5 X10E3/UL (ref 0.1–0.9)
MONOCYTES NFR BLD AUTO: 9 %
NEUTROPHILS # BLD AUTO: 2.7 X10E3/UL (ref 1.4–7)
NEUTROPHILS NFR BLD AUTO: 51 %
PLATELET # BLD AUTO: 303 X10E3/UL (ref 150–450)
POTASSIUM SERPL-SCNC: 4.4 MMOL/L (ref 3.5–5.2)
PROT SERPL-MCNC: 6.4 G/DL (ref 6–8.5)
RBC # BLD AUTO: 5.07 X10E6/UL (ref 4.14–5.8)
SODIUM SERPL-SCNC: 141 MMOL/L (ref 134–144)
TRIGL SERPL-MCNC: 56 MG/DL (ref 0–149)
TSH SERPL DL<=0.005 MIU/L-ACNC: 0.99 UIU/ML (ref 0.45–4.5)
VLDLC SERPL CALC-MCNC: 12 MG/DL (ref 5–40)
WBC # BLD AUTO: 5.2 X10E3/UL (ref 3.4–10.8)

## 2024-04-25 DIAGNOSIS — R74.01 ELEVATED ALT MEASUREMENT: ICD-10-CM

## 2024-04-25 DIAGNOSIS — R74.01 ELEVATED AST (SGOT): Primary | ICD-10-CM

## 2024-06-19 DIAGNOSIS — F32.4 MAJOR DEPRESSIVE DISORDER WITH SINGLE EPISODE, IN PARTIAL REMISSION: ICD-10-CM

## 2024-06-20 RX ORDER — VENLAFAXINE HYDROCHLORIDE 75 MG/1
75 CAPSULE, EXTENDED RELEASE ORAL DAILY
Qty: 90 CAPSULE | Refills: 0 | Status: SHIPPED | OUTPATIENT
Start: 2024-06-20

## 2024-06-25 ENCOUNTER — LAB (OUTPATIENT)
Dept: FAMILY MEDICINE CLINIC | Facility: CLINIC | Age: 49
End: 2024-06-25
Payer: COMMERCIAL

## 2024-08-27 DIAGNOSIS — F32.4 MAJOR DEPRESSIVE DISORDER WITH SINGLE EPISODE, IN PARTIAL REMISSION: ICD-10-CM

## 2024-08-27 RX ORDER — VENLAFAXINE HYDROCHLORIDE 75 MG/1
75 CAPSULE, EXTENDED RELEASE ORAL DAILY
Qty: 90 CAPSULE | Refills: 0 | Status: SHIPPED | OUTPATIENT
Start: 2024-08-27

## 2024-11-03 DIAGNOSIS — F32.4 MAJOR DEPRESSIVE DISORDER WITH SINGLE EPISODE, IN PARTIAL REMISSION: ICD-10-CM

## 2024-11-04 RX ORDER — VENLAFAXINE HYDROCHLORIDE 75 MG/1
75 CAPSULE, EXTENDED RELEASE ORAL DAILY
Qty: 90 CAPSULE | Refills: 0 | Status: SHIPPED | OUTPATIENT
Start: 2024-11-04

## 2024-12-08 NOTE — PROGRESS NOTES
Chief Complaint  Depression (6 month f/u )    Subjective          Aidan Breaux presents to [facility]    HPI         The patient is a 49-year-old male who presents for a follow-up visit.    He reports experiencing a pinched nerve in his neck, which causes pain radiating down his arm. The pain is not severe today but intensifies during his work, which involves lifting, pulling, and frequent turning. He has not taken any medication for this pain. He also experiences occasional numbness and tingling in his arm, which he attributes to his work. He has tried ibuprofen, but it did not provide relief.    He suspects the presence of a cyst in his rectal area, which he believes is causing constipation and thin, ribbon-like stools. This has been ongoing for about 4 months. He reports no pain or blood in his bowel movements. He has never had a colonoscopy. He recalls having a pilonidal cyst in his early 20s, which was treated with antibiotics prescribed by Dr. Sanchez. The cyst resolved for a few years but has recently recurred. He believes the cyst has moved to a different position, near the rectum, and is causing a blockage. He also mentions having scars from previous channels and three smaller holes above the crevice. He underwent surgery for hemorrhoids at age 26.    He is currently on a low dose of venlafaxine (75 mg) for depression. He has considered increasing the dosage as he felt better when he accidentally took two pills. He reports no suicidal thoughts or plans. He is aware that his mental health issues are likely to persist throughout his life.    He suspects an enlarged prostate due to frequent urination, needing to use the bathroom 2 to 3 times within a 2-hour period at work. He does not strain while urinating, but the process takes a long time. He also reports leakage during erections.       OTHER NOTES:          Review of Systems     Objective       Vital Signs:   /74    "Pulse 78   Temp 97.5 °F (36.4 °C)   Resp 18   Ht 170.2 cm (67\")   Wt 75.3 kg (166 lb)   BMI 26.00 kg/m²     Physical Exam  Vitals and nursing note reviewed.   Constitutional:       General: He is not in acute distress.     Appearance: Normal appearance. He is well-developed. He is not ill-appearing.   HENT:      Head: Normocephalic and atraumatic.      Right Ear: External ear normal.      Left Ear: External ear normal.      Mouth/Throat:      Mouth: Mucous membranes are moist.      Pharynx: No oropharyngeal exudate or posterior oropharyngeal erythema.   Cardiovascular:      Rate and Rhythm: Normal rate and regular rhythm.      Heart sounds: Normal heart sounds.   Pulmonary:      Effort: Pulmonary effort is normal. No respiratory distress.      Breath sounds: Normal breath sounds. No wheezing or rales.   Musculoskeletal:      Right lower leg: No edema.      Left lower leg: No edema.   Skin:     General: Skin is warm and dry.   Neurological:      Mental Status: He is alert.   Psychiatric:         Mood and Affect: Mood normal.         Behavior: Behavior normal.        Rectum reveals some redundant tissue around the anus consistent with possible prior hemorrhoidal tissue.  There is erythematous tissue extruding from the anus but it is circumferential.  Nontender and not hard or firm.    There is tenderness of the left lateral neck.  Down into the upper thoracic area on the left side.  No spinal tenderness.            Result Review :            Other Results    Results  - Laboratory Studies:    - Liver tests: normal    - Blood count: normal    - Cholesterol: good             Assessment and Plan    Diagnoses and all orders for this visit:    1. Neck pain (Primary)  -     diclofenac (VOLTAREN) 50 MG EC tablet; Take 1 tablet by mouth 2 (Two) Times a Day.  Dispense: 40 tablet; Refill: 1    2. Constipation, unspecified constipation type  -     Comprehensive Metabolic Panel  -     CBC & Differential    3. Change in stool " caliber  -     Comprehensive Metabolic Panel  -     CBC & Differential    4. Major depressive disorder with single episode, in partial remission  -     venlafaxine XR (Effexor XR) 150 MG 24 hr capsule; Take 1 capsule by mouth Daily.  Dispense: 90 capsule; Refill: 1    5. Elevated AST (SGOT)  -     Comprehensive Metabolic Panel  -     CBC & Differential    6. Colon cancer screening  -     Ambulatory Referral For Screening Colonoscopy    7. Increased frequency of urination  -     PSA Screen    8. Prostate cancer screening  -     PSA Screen               DISCUSSION       Neck pain.  The pain is likely due to inflammation of the neck muscles, possibly from lifting and pulling at work. Diclofenac was prescribed, to be taken twice daily.    Colon cancer screening.  The inflammation around the rectum does not appear to be caused by a cyst. There is some extra tissue present, possibly indicative of hemorrhoids. A colonoscopy was recommended for further evaluation.  He is due for colon cancer screening so we will get that set up hopefully urgently given the changes in his stool.  He is reporting ribbon like stools at times.      Depression.  The dosage of venlafaxine was increased to 150 mg. He can take two of the 75 mg tablets he currently has or a new prescription for 150 mg will be sent to his pharmacy.    Urinary symptoms.  A PSA test was ordered to rule out prostate cancer. Additionally, a blood test to check liver function was ordered.    Stool changes, will check CBC and CMP as well.           Rayshawn dated 12/9/2024  was reviewed and appropriate.     Follow Up   Return in about 6 months (around 6/9/2025).    Patient was given instructions and counseling regarding his condition or for health maintenance advice. Please see specific information pulled into the AVS if appropriate.       Joaquim Smith MD    Patient or patient representative verbalized consent for the use of Ambient Listening during the visit with  Joaquim  COLETTE Smith MD for chart documentation. 12/9/2024  15:02 EST

## 2024-12-09 ENCOUNTER — OFFICE VISIT (OUTPATIENT)
Dept: FAMILY MEDICINE CLINIC | Facility: CLINIC | Age: 49
End: 2024-12-09
Payer: COMMERCIAL

## 2024-12-09 VITALS
WEIGHT: 166 LBS | DIASTOLIC BLOOD PRESSURE: 74 MMHG | RESPIRATION RATE: 18 BRPM | BODY MASS INDEX: 26.06 KG/M2 | HEIGHT: 67 IN | SYSTOLIC BLOOD PRESSURE: 110 MMHG | HEART RATE: 78 BPM | TEMPERATURE: 97.5 F

## 2024-12-09 DIAGNOSIS — R19.5 CHANGE IN STOOL CALIBER: ICD-10-CM

## 2024-12-09 DIAGNOSIS — Z12.11 COLON CANCER SCREENING: ICD-10-CM

## 2024-12-09 DIAGNOSIS — F32.4 MAJOR DEPRESSIVE DISORDER WITH SINGLE EPISODE, IN PARTIAL REMISSION: ICD-10-CM

## 2024-12-09 DIAGNOSIS — K59.00 CONSTIPATION, UNSPECIFIED CONSTIPATION TYPE: ICD-10-CM

## 2024-12-09 DIAGNOSIS — Z12.5 PROSTATE CANCER SCREENING: ICD-10-CM

## 2024-12-09 DIAGNOSIS — M54.2 NECK PAIN: Primary | ICD-10-CM

## 2024-12-09 DIAGNOSIS — R35.0 INCREASED FREQUENCY OF URINATION: ICD-10-CM

## 2024-12-09 DIAGNOSIS — R74.01 ELEVATED AST (SGOT): ICD-10-CM

## 2024-12-09 PROCEDURE — 99214 OFFICE O/P EST MOD 30 MIN: CPT | Performed by: FAMILY MEDICINE

## 2024-12-09 RX ORDER — VENLAFAXINE HYDROCHLORIDE 150 MG/1
150 CAPSULE, EXTENDED RELEASE ORAL DAILY
Qty: 90 CAPSULE | Refills: 1 | Status: SHIPPED | OUTPATIENT
Start: 2024-12-09

## 2024-12-10 LAB
ALBUMIN SERPL-MCNC: 4.5 G/DL (ref 4.1–5.1)
ALP SERPL-CCNC: 91 IU/L (ref 44–121)
ALT SERPL-CCNC: 15 IU/L (ref 0–44)
AST SERPL-CCNC: 16 IU/L (ref 0–40)
BASOPHILS # BLD AUTO: 0 X10E3/UL (ref 0–0.2)
BASOPHILS NFR BLD AUTO: 1 %
BILIRUB SERPL-MCNC: 0.3 MG/DL (ref 0–1.2)
BUN SERPL-MCNC: 14 MG/DL (ref 6–24)
BUN/CREAT SERPL: 15 (ref 9–20)
CALCIUM SERPL-MCNC: 9.4 MG/DL (ref 8.7–10.2)
CHLORIDE SERPL-SCNC: 103 MMOL/L (ref 96–106)
CO2 SERPL-SCNC: 27 MMOL/L (ref 20–29)
CREAT SERPL-MCNC: 0.95 MG/DL (ref 0.76–1.27)
EGFRCR SERPLBLD CKD-EPI 2021: 99 ML/MIN/1.73
EOSINOPHIL # BLD AUTO: 0.2 X10E3/UL (ref 0–0.4)
EOSINOPHIL NFR BLD AUTO: 4 %
ERYTHROCYTE [DISTWIDTH] IN BLOOD BY AUTOMATED COUNT: 12.2 % (ref 11.6–15.4)
GLOBULIN SER CALC-MCNC: 2 G/DL (ref 1.5–4.5)
GLUCOSE SERPL-MCNC: 77 MG/DL (ref 70–99)
HCT VFR BLD AUTO: 47.8 % (ref 37.5–51)
HGB BLD-MCNC: 15.8 G/DL (ref 13–17.7)
IMM GRANULOCYTES # BLD AUTO: 0 X10E3/UL (ref 0–0.1)
IMM GRANULOCYTES NFR BLD AUTO: 0 %
LYMPHOCYTES # BLD AUTO: 1.8 X10E3/UL (ref 0.7–3.1)
LYMPHOCYTES NFR BLD AUTO: 33 %
MCH RBC QN AUTO: 31.2 PG (ref 26.6–33)
MCHC RBC AUTO-ENTMCNC: 33.1 G/DL (ref 31.5–35.7)
MCV RBC AUTO: 95 FL (ref 79–97)
MONOCYTES # BLD AUTO: 0.5 X10E3/UL (ref 0.1–0.9)
MONOCYTES NFR BLD AUTO: 9 %
NEUTROPHILS # BLD AUTO: 3 X10E3/UL (ref 1.4–7)
NEUTROPHILS NFR BLD AUTO: 53 %
PLATELET # BLD AUTO: 344 X10E3/UL (ref 150–450)
POTASSIUM SERPL-SCNC: 4.7 MMOL/L (ref 3.5–5.2)
PROT SERPL-MCNC: 6.5 G/DL (ref 6–8.5)
PSA SERPL-MCNC: 1.3 NG/ML (ref 0–4)
RBC # BLD AUTO: 5.06 X10E6/UL (ref 4.14–5.8)
SODIUM SERPL-SCNC: 142 MMOL/L (ref 134–144)
WBC # BLD AUTO: 5.5 X10E3/UL (ref 3.4–10.8)

## 2024-12-17 RX ORDER — SODIUM, POTASSIUM,MAG SULFATES 17.5-3.13G
1 SOLUTION, RECONSTITUTED, ORAL ORAL TAKE AS DIRECTED
Qty: 354 ML | Refills: 0 | Status: SHIPPED | OUTPATIENT
Start: 2024-12-17

## 2024-12-23 ENCOUNTER — OUTSIDE FACILITY SERVICE (OUTPATIENT)
Dept: GASTROENTEROLOGY | Facility: CLINIC | Age: 49
End: 2024-12-23
Payer: COMMERCIAL

## 2024-12-23 PROCEDURE — 45378 DIAGNOSTIC COLONOSCOPY: CPT | Performed by: INTERNAL MEDICINE

## 2025-05-23 DIAGNOSIS — F32.4 MAJOR DEPRESSIVE DISORDER WITH SINGLE EPISODE, IN PARTIAL REMISSION: ICD-10-CM

## 2025-05-23 RX ORDER — VENLAFAXINE HYDROCHLORIDE 150 MG/1
150 CAPSULE, EXTENDED RELEASE ORAL DAILY
Qty: 90 CAPSULE | Refills: 0 | Status: SHIPPED | OUTPATIENT
Start: 2025-05-23

## 2025-07-31 ENCOUNTER — OFFICE VISIT (OUTPATIENT)
Dept: FAMILY MEDICINE CLINIC | Facility: CLINIC | Age: 50
End: 2025-07-31
Payer: COMMERCIAL

## 2025-07-31 VITALS
TEMPERATURE: 97.8 F | DIASTOLIC BLOOD PRESSURE: 78 MMHG | SYSTOLIC BLOOD PRESSURE: 112 MMHG | WEIGHT: 169 LBS | OXYGEN SATURATION: 99 % | BODY MASS INDEX: 26.53 KG/M2 | HEART RATE: 82 BPM | HEIGHT: 67 IN

## 2025-07-31 DIAGNOSIS — F33.41 RECURRENT MAJOR DEPRESSIVE DISORDER, IN PARTIAL REMISSION: ICD-10-CM

## 2025-07-31 DIAGNOSIS — K59.01 SLOW TRANSIT CONSTIPATION: Primary | ICD-10-CM

## 2025-07-31 PROCEDURE — 99213 OFFICE O/P EST LOW 20 MIN: CPT | Performed by: FAMILY MEDICINE

## 2025-07-31 RX ORDER — VENLAFAXINE HYDROCHLORIDE 150 MG/1
150 CAPSULE, EXTENDED RELEASE ORAL DAILY
Qty: 90 CAPSULE | Refills: 1 | Status: SHIPPED | OUTPATIENT
Start: 2025-07-31

## 2025-07-31 NOTE — PROGRESS NOTES
"Chief Complaint  6 month follow up (No concerns.)    Subjective          Aidan Breaux presents to [facility]    HPI         The patient presents for follow-up.    He reports bowel movements once a week, large in volume, without discomfort, pain, bloating, or nausea. This pattern started after his colonoscopy on 12/23/2024, which showed no abnormalities. He does not drink enough water and consumes a lot of soda. He used MiraLAX before his colonoscopy.    He continues Effexor 150 mg daily, which he believes is helping his mood. He reports no thoughts of self-harm and no side effects. His sleep has improved from 2-3 hours to 4-5 hours per night since switching to day shifts at work. He does not feel excessively tired. He occasionally experiences burping, which resolves on its own and may be a side effect of Effexor.    Occupations: Day shift worker  Hobbies: Riding motorcycle  Diet: Consumes a lot of soda  Tobacco: Vapes  Sleep: 4-5 hours per night    PAST SURGICAL HISTORY:  Colonoscopy on 12/23/2024       OTHER NOTES:          Review of Systems   Respiratory: Negative.     Cardiovascular: Negative.    Gastrointestinal: Negative.    All other systems reviewed and are negative.       Objective       Vital Signs:   /78   Pulse 82   Temp 97.8 °F (36.6 °C)   Ht 170.2 cm (67\")   Wt 76.7 kg (169 lb)   SpO2 99%   BMI 26.47 kg/m²     Physical Exam  Vitals and nursing note reviewed.   Constitutional:       General: He is not in acute distress.     Appearance: Normal appearance. He is not ill-appearing or toxic-appearing.   HENT:      Head: Normocephalic.   Neurological:      Mental Status: He is alert.   Psychiatric:         Mood and Affect: Mood normal.         Behavior: Behavior normal.             Ears: Normal  Mouth/Throat: Normal  Respiratory: Clear to auscultation, no wheezing, rales, or rhonchi  Cardiovascular: Regular rate and rhythm, no murmurs, rubs, or gallops   "     Result Review :            Other Results    Results  - Labs: Last visit    - Prostate test: Normal    - Kidney function test: Normal    - Liver function test: Normal    - Blood count: Normal    - Cholesterol: Normal             Assessment and Plan    Diagnoses and all orders for this visit:    1. Slow transit constipation (Primary)    2. Recurrent major depressive disorder, in partial remission  -     venlafaxine XR (EFFEXOR-XR) 150 MG 24 hr capsule; Take 1 capsule by mouth Daily.  Dispense: 90 capsule; Refill: 1               DISCUSSION       1. Constipation.  Bowel movements occur once a week without pain, bloating, or nausea since the colonoscopy on 12/23/2024. The patient is advised to increase water intake and reduce soda consumption. MiraLAX is recommended twice a week, with an increase to three times if there is no improvement.    2. Mood disorder.  The patient is taking Effexor 150 mg daily, with a stable mood and no side effects. There are no thoughts of self-harm. The current medication regimen should be maintained. A prescription refill for Effexor 150 mg has been sent to the pharmacy.    Follow-up: Scheduled in 6 months, or sooner if issues arise.         Follow Up   Return in about 6 months (around 1/31/2026).    Patient was given instructions and counseling regarding his condition or for health maintenance advice. Please see specific information pulled into the AVS if appropriate.       Joaquim Smith MD    Patient or patient representative verbalized consent for the use of Ambient Listening during the visit with  Joaquim Smith MD for chart documentation. 7/31/2025  18:01 EDT